# Patient Record
Sex: FEMALE | Race: WHITE | Employment: OTHER | URBAN - NONMETROPOLITAN AREA
[De-identification: names, ages, dates, MRNs, and addresses within clinical notes are randomized per-mention and may not be internally consistent; named-entity substitution may affect disease eponyms.]

---

## 2017-05-10 DIAGNOSIS — R91.1 SOLITARY LUNG NODULE: Primary | ICD-10-CM

## 2017-07-13 ENCOUNTER — OFFICE VISIT (OUTPATIENT)
Dept: AUDIOLOGY | Facility: OTHER | Age: 82
End: 2017-07-13
Attending: AUDIOLOGIST
Payer: MEDICARE

## 2017-07-13 DIAGNOSIS — H90.3 SENSORINEURAL HEARING LOSS, ASYMMETRICAL: Primary | ICD-10-CM

## 2017-07-13 PROCEDURE — 92557 COMPREHENSIVE HEARING TEST: CPT | Performed by: AUDIOLOGIST

## 2017-07-13 PROCEDURE — 92567 TYMPANOMETRY: CPT | Performed by: AUDIOLOGIST

## 2017-07-13 NOTE — MR AVS SNAPSHOT
"              After Visit Summary   7/13/2017    Marylu Schultz    MRN: [id number]           Patient Information     Date Of Birth          2/9/1933        Visit Information        Provider Department      7/13/2017 9:15 AM Lianet Basilio AuD [facility]        Today's Diagnoses     Sensorineural hearing loss, asymmetrical    -  1       Follow-ups after your visit        Your next 10 appointments already scheduled     Aug 04, 2017  9:00 AM CDT   (Arrive by 8:45 AM)   New Visit with Fidelina Avelar PA-C   [facility] [place] ([facility] [place] )    [address]  [place] [address]   [phone]            Aug 04, 2017 10:30 AM CDT   (Arrive by 10:15 AM)   Hearing Aid Initial with Cherelle Ham   [facility] [place] ([facility] [place] )    [address]  [place] [address]   [phone]              Who to contact     If you have questions or need follow up information about today's clinic visit or your schedule please contact [facility] directly at [phone].  Normal or non-critical lab and imaging results will be communicated to you by MyChart, letter or phone within 4 business days after the clinic has received the results. If you do not hear from us within 7 days, please contact the clinic through Sharely.Ushart or phone. If you have a critical or abnormal lab result, we will notify you by phone as soon as possible.  Submit refill requests through Scotty Gear or call your pharmacy and they will forward the refill request to us. Please allow 3 business days for your refill to be completed.          Additional Information About Your Visit        MyChart Information     Scotty Gear lets you send messages to your doctor, view your test results, renew your prescriptions, schedule appointments and more. To sign up, go to [email or web address] . Click on \"Log in\" on the left side of the screen, which will take you to the Welcome page. Then click on " "\"Sign up Now\" on the right side of the page.     You will be asked to enter the access code listed below, as well as some personal information. Please follow the directions to create your username and password.     Your access code is: [email or web address]  Expires: 10/11/2017  9:52 AM     Your access code will  in 90 days. If you need help or a new code, please call your [place] clinic or [phone].        Care EveryWhere ID     This is your Care EveryWhere ID. This could be used by other organizations to access your [place] medical records  [id number]         Blood Pressure from Last 3 Encounters:   No data found for BP    Weight from Last 3 Encounters:   No data found for Wt              Today, you had the following     No orders found for display       Primary Care Provider Office Phone #    Geraldine Juan [phone]       XX NO INFO FOUND XX  [facility] [address]        Equal Access to Services     [facility]: Hadii aad ku hadasho Soomaali, waaxda luqadaha, qaybta kaalmada adeegyada, waxay idiin hayaan holly elias . So [facility] [phone].    ATENCIÓN: Si habla español, tiene a raygoza disposición servicios gratuitos de asistencia lingüística. Llame al [phone].    We comply with applicable federal civil rights laws and [place] laws. We do not discriminate on the basis of race, color, national origin, age, disability sex, sexual orientation or gender identity.            Thank you!     Thank you for choosing [facility]  for your care. Our goal is always to provide you with excellent care. Hearing back from our patients is one way we can continue to improve our services. Please take a few minutes to complete the written survey that you may receive in the mail after your visit with us. Thank you!             Your Updated Medication List - Protect others around you: Learn how to safely use, store and throw away your medicines at www.disposemymeds.org.      Notice  As of 2017  " 9:52 AM    You have not been prescribed any medications.

## 2017-07-13 NOTE — PROGRESS NOTES
Audiology report faxed to primary provider Meenakshi Juan CNP. [facility] at [phone]. Telephone [phone]

## 2017-07-13 NOTE — PROGRESS NOTES
Audiology Evaluation Completed. Please refer SCANNED AUDIOGRAM and/or TYMPANOGRAM for BACKGROUND, RESULTS, RECOMMENDATIONS.    UNDER RECOMMENDATIONS ON AUDIOGRAM PATIENT REFERRED TO ENT WITH SYMPTOMS      Lianet MCCABE, [facility]-A  Audiologist #[id number]

## 2017-08-04 ENCOUNTER — OFFICE VISIT (OUTPATIENT)
Dept: OTOLARYNGOLOGY | Facility: OTHER | Age: 82
End: 2017-08-04
Attending: PHYSICIAN ASSISTANT
Payer: MEDICARE

## 2017-08-04 VITALS
SYSTOLIC BLOOD PRESSURE: 128 MMHG | TEMPERATURE: 98.5 F | DIASTOLIC BLOOD PRESSURE: 82 MMHG | HEIGHT: 62 IN | OXYGEN SATURATION: 95 % | HEART RATE: 66 BPM | BODY MASS INDEX: 22.63 KG/M2 | WEIGHT: 123 LBS

## 2017-08-04 DIAGNOSIS — H90.3 ASNHL (ASYMMETRICAL SENSORINEURAL HEARING LOSS): Primary | ICD-10-CM

## 2017-08-04 PROCEDURE — 99203 OFFICE O/P NEW LOW 30 MIN: CPT

## 2017-08-04 PROCEDURE — 99214 OFFICE O/P EST MOD 30 MIN: CPT

## 2017-08-04 PROCEDURE — 99214 OFFICE O/P EST MOD 30 MIN: CPT | Mod: 25 | Performed by: PHYSICIAN ASSISTANT

## 2017-08-04 PROCEDURE — 92504 EAR MICROSCOPY EXAMINATION: CPT | Performed by: PHYSICIAN ASSISTANT

## 2017-08-04 RX ORDER — PAROXETINE 10 MG/5ML
10 SUSPENSION ORAL EVERY MORNING
COMMUNITY

## 2017-08-04 RX ORDER — DOXEPIN HYDROCHLORIDE 10 MG/ML
10 SOLUTION ORAL AT BEDTIME
COMMUNITY

## 2017-08-04 ASSESSMENT — PAIN SCALES - GENERAL: PAINLEVEL: NO PAIN (0)

## 2017-08-04 NOTE — NURSING NOTE
"Chief Complaint   Patient presents with     Consult     ASNHL LOSS/HA Medical Clearance       Initial /82 (BP Location: Right arm, Patient Position: Chair, Cuff Size: Adult Regular)  Pulse 66  Temp 98.5  F (36.9  C) (Tympanic)  Ht 5' 2\" (1.575 m)  Wt 123 lb (55.8 kg)  SpO2 95%  BMI 22.5 kg/m2 Estimated body mass index is 22.5 kg/(m^2) as calculated from the following:    Height as of this encounter: 5' 2\" (1.575 m).    Weight as of this encounter: 123 lb (55.8 kg).  Medication Reconciliation: complete   Estela Schwartz        "

## 2017-08-04 NOTE — MR AVS SNAPSHOT
"              After Visit Summary   8/4/2017    Marylu Schultz    MRN: [id number]           Patient Information     Date Of Birth          2/9/1933        Visit Information        Provider Department      8/4/2017 9:00 AM Fidelina Avelar PA-C [facility]        Today's Diagnoses     ASNHL (asymmetrical sensorineural hearing loss)    -  1      Care Instructions    Ears were cleaned  Ears look well. There is no fluid/ infection    May proceed with hearing aid consult  Return in spring for recheck of right ear- hearing  If there are new symptoms contact nurse    If there are concerns or questions,C all [phone] and ask for nurse          Follow-ups after your visit        Your next 10 appointments already scheduled     Aug 04, 2017 10:30 AM CDT   (Arrive by 10:15 AM)   Hearing Aid Initial with Cherelle Ham   [facility] [place] ([facility] [place] )    [address]  [place] [address]   [phone]              Who to contact     If you have questions or need follow up information about today's clinic visit or your schedule please contact [facility] directly at [phone].  Normal or non-critical lab and imaging results will be communicated to you by Everyware Globalhart, letter or phone within 4 business days after the clinic has received the results. If you do not hear from us within 7 days, please contact the clinic through Everyware Globalhart or phone. If you have a critical or abnormal lab result, we will notify you by phone as soon as possible.  Submit refill requests through [employer] or call your pharmacy and they will forward the refill request to us. Please allow 3 business days for your refill to be completed.          Additional Information About Your Visit        MyChart Information     [employer] lets you send messages to your doctor, view your test results, renew your prescriptions, schedule appointments and more. To sign up, go to [email or web address] . Click on \"Log in\" " "on the left side of the screen, which will take you to the Welcome page. Then click on \"Sign up Now\" on the right side of the page.     You will be asked to enter the access code listed below, as well as some personal information. Please follow the directions to create your username and password.     Your access code is: [email or web address]  Expires: 10/11/2017  9:52 AM     Your access code will  in 90 days. If you need help or a new code, please call your [place] clinic or [phone].        Care EveryWhere ID     This is your Care EveryWhere ID. This could be used by other organizations to access your [place] medical records  [id number]        Your Vitals Were     Pulse Temperature Height Pulse Oximetry BMI (Body Mass Index)       66 98.5  F (36.9  C) (Tympanic) 5' 2\" (1.575 m) 95% 22.5 kg/m2        Blood Pressure from Last 3 Encounters:   17 128/82    Weight from Last 3 Encounters:   17 123 lb (55.8 kg)              Today, you had the following     No orders found for display       Primary Care Provider Office Phone #    Geraldine Juan [phone]       XX NO INFO FOUND XX  [facility] [address]        Equal Access to Services     [facility] : Hadii aad ku hadasho Soomaali, waaxda luqadaha, qaybta kaalmada adeegyada, waxay idiin hayyonn holly jamil laelba . So [facility] [phone].    ATENCIÓN: Si habla español, tiene a raygoza disposición servicios gratuitos de asistencia lingüística. Llame al [phone].    We comply with applicable federal civil rights laws and [place] laws. We do not discriminate on the basis of race, color, national origin, age, disability sex, sexual orientation or gender identity.            Thank you!     Thank you for choosing [facility]  for your care. Our goal is always to provide you with excellent care. Hearing back from our patients is one way we can continue to improve our services. Please take a few minutes to complete the written survey that you may " receive in the mail after your visit with us. Thank you!             Your Updated Medication List - Protect others around you: Learn how to safely use, store and throw away your medicines at www.disposemymeds.org.          This list is accurate as of: 8/4/17  9:26 AM.  Always use your most recent med list.                   Brand Name Dispense Instructions for use Diagnosis    atenolol 5 mg/mL suspension    TENORMIN     Take 25 mg by mouth daily        doxepin 10 MG/ML (HIGH CONC) solution    SINEquan     Take 10 mg by mouth At Bedtime        enalapril 1 MG/ML solution    EPANED     Take 20 mg by mouth daily        LORazepam      Inject 1 mg into the vein        melatonin 1 MG/ML Liqd liquid      Take 1 mg by mouth nightly as needed for sleep        PARoxetine 10 MG/5ML suspension    PAXIL     Take 10 mg by mouth every morning

## 2017-08-04 NOTE — PATIENT INSTRUCTIONS
Ears were cleaned  Ears look well. There is no fluid/ infection    May proceed with hearing aid consult  Return in spring for recheck of right ear- hearing  If there are new symptoms contact nurse    If there are concerns or questions,C all [phone] and ask for nurse

## 2017-08-04 NOTE — PROGRESS NOTES
"Chief Complaint   Patient presents with     Consult     ASNHL LOSS/HA Medical Clearance       Patient presents for concerns regarding hearing loss. She had audiogram completed on 7/13/17 here. There was noted ASNHL and patient presents for exam due to this findings  She does have some discomfort with loud noises   REports left ear is better hearing than right ear. She did have noted hearing loss several years ago with asymmetry,   Deboar (daughter in law). Reports difficulty hearing in conversation.   She has a hx of hearing loss for >7 years. Patient relocated from [place] and had issues with hearing loss. She reports a gradual hearing loss.   Patient did repots skin cancer with section of portion of her ear.       She has no otalgia, otorrhea  Denies vertigo  No hx of COM or otologic surgeries  No congential fm hx hearing loss.   No tinnitus.   No chronic noise exposure.     Audiogram- 7/13/17  Type A tympanograms  Thresholds are moderate to severe left and moderate-severe to severe right.   Word discrimination excellent        No past medical history on file.   No Known Allergies  Current Outpatient Prescriptions   Medication     atenolol (TENORMIN) 5 mg/mL suspension     doxepin (SINEQUAN) 10 MG/ML (HIGH CONC) solution     enalapril (EPANED) 1 MG/ML solution     LORazepam     melatonin (MELATONIN) 1 MG/ML LIQD liquid     PARoxetine (PAXIL) 10 MG/5ML suspension     No current facility-administered medications for this visit.       ROS: 10 point ROS neg other than the symptoms noted above in the HPI.    /82 (BP Location: Right arm, Patient Position: Chair, Cuff Size: Adult Regular)  Pulse 66  Temp 98.5  F (36.9  C) (Tympanic)  Ht 5' 2\" (1.575 m)  Wt 123 lb (55.8 kg)  SpO2 95%  BMI 22.5 kg/m2    General - The patient is well nourished and well developed, and appears to have good nutritional status.  Alert and oriented to person and place, answers questions and cooperates with examination appropriately.   Head " and Face - Normocephalic and atraumatic, with no gross asymmetry noted.  The facial nerve is intact, with strong symmetric movements.  Voice and Breathing - The patient was breathing comfortably without the use of accessory muscles. There was no wheezing, stridor, or stertor.  The patients voice was clear and strong, and had appropriate pitch and quality.  Ears - External ear- right with post surgical changes helical rim c/w resection.The external auditory canals are patent, the tympanic membranes are intact without effusion, retraction or mass.  Bony landmarks are intact. Scant cerumen removed. Ears examined under microscopy. TM's are without effusion.   Eyes - Extraocular movements intact, and the pupils were reactive to light.  Sclera were not icteric or injected, conjunctiva were pink and moist.  Mouth - Examination of the oral cavity showed pink, healthy oral mucosa. No lesions or ulcerations noted.  The tongue was mobile and midline, and the dentition were in good condition.    Throat - The walls of the oropharynx were smooth, pink, moist, symmetric, and had no lesions or ulcerations.  The tonsillar pillars and soft palate were symmetric.  The uvula was midline on elevation.    Neck - Normal midline excursion of the laryngotracheal complex during swallowing.  Full range of motion on passive movement.  Palpation of the occipital, submental, submandibular, internal jugular chain, and supraclavicular nodes did not demonstrate any abnormal lymph nodes or masses.  Palpation of the thyroid was soft and smooth, with no nodules or goiter appreciated.  The trachea was mobile and midline.  Nose - External contour is symmetric, no gross deflection or scars.  Nasal mucosa is pink and moist with no abnormal mucus.         ASSESSMENT:    ICD-10-CM    1. ASNHL (asymmetrical sensorineural hearing loss) H90.5 AUDIOLOGY ADULT REFERRAL          I have discussed options with the patient.  MRI of the IAC was discussed at this time  to rule out retrocochlear lesions.  We reviewed consideration, however patient is 84 and would not likely consider surgical removal. Would repeat hearing test in 6 months unless symptoms develop. She is in agreement with this plan. She has hx of R>L hearing loss for quite sometime.   The loss of hearing in the one ear leaves only one that is functioning well.  It becomes imperative that the remaining ear be protected from noise exposure and if hearing loss were to occur in the remaining ear, the patient needs to be seen in a few days so as to commence timely work-up and management.  The specifics of this were reviewed with the patient.      A hearing aide evaluation is often recommended when a patient exhibits a sensorineural hearing loss that is moderate or below a 40dB loss at 2000 Hz.       A follow-up audiogram is recommended in 6 months.  This should be sooner if a patient develops vertigo, new or asymmetric hearing loss or unresolved unilateral tinnitus.  If these symptoms were to develop, an MRI would need to be obtained.    The recommendations from the tinnitus brochure were discussed.  The most common reason for tinnitus is damage to the microscopic endings of the hearing nerve in the inner ear.  Injury to the nerve endings brings on hearing loss and often tinnitus.  Advancing age can accompany hearing loss and tinnitus.  If a person is younger, loud noise probably is the leading cause of tinnitus.  Delayed damage to hearing is often seen as well.  Ear protection from noise exposure was reviewed.  Highlights included low salt diet, control of hypertension, avoiding stimulants such as caffeine, tobacco or alcohol and proper sleep, exercise and stress management.        Fidelina Avelar PA-C  ENT  [facility] [place]  [phone]'

## 2017-08-30 ENCOUNTER — OFFICE VISIT (OUTPATIENT)
Dept: AUDIOLOGY | Facility: OTHER | Age: 82
End: 2017-08-30
Attending: AUDIOLOGIST
Payer: MEDICARE

## 2017-08-30 DIAGNOSIS — H90.3 SENSORINEURAL HEARING LOSS, BILATERAL: Primary | ICD-10-CM

## 2017-08-30 DIAGNOSIS — H90.3 ASNHL (ASYMMETRICAL SENSORINEURAL HEARING LOSS): ICD-10-CM

## 2017-08-30 PROCEDURE — 92591 ZZHC HEARING AID EXAM BINAURAL: CPT | Performed by: AUDIOLOGIST

## 2017-08-30 NOTE — PROGRESS NOTES
BACKGROUND:  Marylu was seen today for consult regarding hearing aids, accompanied by her son. The patient notes difficulty with communication in a variety of listening situations and would like to explore possible benefit obtained via use of amplification.      Patient has received medical clearance for hearing aid use from Fidelina DAS.  Marylu is a binaural hearing aid candidate and will receive a Hearing Aid Recommendation today.    RESULTS:  Brochure form [place] Department of Health titled 'Legal rights and consumer information about purchasing a hearing instrument ' verbally reviewed and given to patient. Trial Period, cancellation fee, warranties highlighted. Estimated insurance coverage for hearing aids reviewed.Patient risk factors have been provided to the patient in writing prior to the sale of the hearing aid per FDA regulation. The risk factors are also available in the User Instructional Booklet to be presented on the day of the hearing aid fitting.      ABN (Advanced Beneficiary Notice of Non-Coverage) completed and copy given to patient.    Thru use of hearing aids she would like to improve her ability to hear:    I where there are groups and noise at home with family and at prayer meetings at [facility].      Discussed hearing aid styles, technology, features, and options at length with Marylu.  Sample devices were shown. Pros/Cons of options reviewed.  Expectations for amplification discussed. .Also discussed the importance of binaural amplification for hearing speech in the presence of background noise and localizing the directions of sounds.  Wireless options were also discussed at length and sample devices were shown.     Hearing Aid Recommendations: Hearing Aid Recommendation reviewed with patient. Pt chose to proceed with order and Purchase Agreement completed. Copies provided to patient.      Hearing Aids:  Binaural Unitron Stride 700 P ITC  Color: beige  Battery Size: 312      Ear  Impression(s):  Otoscopy revealed clear ear canals bilaterally and otoblock placed appropriately.      Ear Impressions were taken and no concerns thru otoscopic visualization after removal. Tthe hearing aid(s) and earmold(s) were ordered.     RECOMMENDATIONS:  Recommended to return to clinic in 3 weeks for hearing aid fitting, programming and orientation.    Lianet Basilio M.S.,[facility]-A  Audiologist #[id number]

## 2017-08-30 NOTE — MR AVS SNAPSHOT
"              After Visit Summary   2017    Marylu Schultz    MRN: [id number]           Patient Information     Date Of Birth          1933        Visit Information        Provider Department      2017 10:00 AM Lianet Basilio AuD [facility] [place]        Today's Diagnoses     Sensorineural hearing loss, bilateral    -  1    ASNHL (asymmetrical sensorineural hearing loss)           Follow-ups after your visit        Who to contact     If you have questions or need follow up information about today's clinic visit or your schedule please contact [facility] directly at [phone].  Normal or non-critical lab and imaging results will be communicated to you by Chlorogenhart, letter or phone within 4 business days after the clinic has received the results. If you do not hear from us within 7 days, please contact the clinic through Chlorogenhart or phone. If you have a critical or abnormal lab result, we will notify you by phone as soon as possible.  Submit refill requests through [employer] or call your pharmacy and they will forward the refill request to us. Please allow 3 business days for your refill to be completed.          Additional Information About Your Visit        MyChart Information     [employer] lets you send messages to your doctor, view your test results, renew your prescriptions, schedule appointments and more. To sign up, go to [email or web address] . Click on \"Log in\" on the left side of the screen, which will take you to the Welcome page. Then click on \"Sign up Now\" on the right side of the page.     You will be asked to enter the access code listed below, as well as some personal information. Please follow the directions to create your username and password.     Your access code is: [email or web address]  Expires: 10/11/2017  9:52 AM     Your access code will  in 90 days. If you need help or a new code, please call your [place] clinic or [phone].        Care EveryWhere ID  "    This is your Care EveryWhere ID. This could be used by other organizations to access your [place] medical records  [id number]         Blood Pressure from Last 3 Encounters:   08/04/17 128/82    Weight from Last 3 Encounters:   08/04/17 123 lb (55.8 kg)              We Performed the Following     AUDIOLOGY ADULT REFERRAL        Primary Care Provider Office Phone #    Geraldine Juan [phone]       XX NO INFO FOUND XX  [place] [address]        Equal Access to Services     [facility]: Hadii aad ku hadasho Soomaali, waaxda luqadaha, qaybta kaalmada adeegyada, waxay idiin hayaan adeeg kharash la'aan . So [facility] [phone].    ATENCIÓN: Si habla español, tiene a raygoza disposición servicios gratuitos de asistencia lingüística. Ll[facility] [phone].    We comply with applicable federal civil rights laws and [place] laws. We do not discriminate on the basis of race, color, national origin, age, disability sex, sexual orientation or gender identity.            Thank you!     Thank you for choosing [facility] [place]  for your care. Our goal is always to provide you with excellent care. Hearing back from our patients is one way we can continue to improve our services. Please take a few minutes to complete the written survey that you may receive in the mail after your visit with us. Thank you!             Your Updated Medication List - Protect others around you: Learn how to safely use, store and throw away your medicines at www.disposemymeds.org.          This list is accurate as of: 8/30/17 10:17 AM.  Always use your most recent med list.                   Brand Name Dispense Instructions for use Diagnosis    atenolol 5 mg/mL suspension    TENORMIN     Take 25 mg by mouth daily        doxepin 10 MG/ML (HIGH CONC) solution    SINEquan     Take 10 mg by mouth At Bedtime        enalapril 1 MG/ML solution    EPANED     Take 20 mg by mouth daily        LORazepam      Inject 1 mg into the vein         melatonin 1 MG/ML Liqd liquid      Take 1 mg by mouth nightly as needed for sleep        PARoxetine 10 MG/5ML suspension    PAXIL     Take 10 mg by mouth every morning

## 2017-09-20 ENCOUNTER — OFFICE VISIT (OUTPATIENT)
Dept: AUDIOLOGY | Facility: OTHER | Age: 82
End: 2017-09-20
Attending: AUDIOLOGIST
Payer: MEDICARE

## 2017-09-20 DIAGNOSIS — H90.3 SENSORINEURAL HEARING LOSS, BILATERAL: Primary | ICD-10-CM

## 2017-09-20 PROCEDURE — V5259 HEARING AID, DIGIT, BIN, ITC: HCPCS | Performed by: AUDIOLOGIST

## 2017-09-20 NOTE — PROGRESS NOTES
AUDIOLOGY REPORT    BACKGROUND INFORMATION: Marylu Schultz was seen in the Audiology Clinic  on 9/20/2017 for fitting of Unitron Stride 600 IN-THE-CANAL Hearing aids.      Serial Number Right: #[id number]  Serial Number Left: #[id number]  Battery Size: #312  Repair and loss and damage warranty expiration:10/4/2020      PROCEDURES: Hearing aids were placed and they provided a good fit,.   After this fitting an orientation was completed giving her information on the use of the instruments. .  Patient was counseled on the occlusion effect, acclimating to hearing aids and adaptation time. Use and care of the instruments was reviewed.  Hearing aid conformity evaluation was performed with good results. Patient was provided wire loop and brush cleaning tools which was demonstrated and patient showed ability to use. The patient demonstrated good ability to insert and remove the earmolds and batteries. Patient was given all supplies 16 cells,  and a Manual for the hearing aids.  How to manually use program toggle for the volume was discussed at length.     SUMMARY AND RECOMMENDATIONS: Hearing aids  were fit today.   The patient will return for follow-up.  Call this clinic with questions regarding today s appointment.        Lianet Basilio M.S., [facility]  Audiologist #[id number]

## 2017-09-20 NOTE — MR AVS SNAPSHOT
"              After Visit Summary   2017    Marylu Schultz    MRN: [id number]           Patient Information     Date Of Birth          1933        Visit Information        Provider Department      2017 9:30 AM Lianet Basilio AuD [facility]        Today's Diagnoses     Sensorineural hearing loss, bilateral    -  1       Follow-ups after your visit        Who to contact     If you have questions or need follow up information about today's clinic visit or your schedule please contact [facility] directly at [phone].  Normal or non-critical lab and imaging results will be communicated to you by Arnicahart, letter or phone within 4 business days after the clinic has received the results. If you do not hear from us within 7 days, please contact the clinic through Arnicahart or phone. If you have a critical or abnormal lab result, we will notify you by phone as soon as possible.  Submit refill requests through [employer] or call your pharmacy and they will forward the refill request to us. Please allow 3 business days for your refill to be completed.          Additional Information About Your Visit        MyChart Information     [employer] lets you send messages to your doctor, view your test results, renew your prescriptions, schedule appointments and more. To sign up, go to [email or web address] . Click on \"Log in\" on the left side of the screen, which will take you to the Welcome page. Then click on \"Sign up Now\" on the right side of the page.     You will be asked to enter the access code listed below, as well as some personal information. Please follow the directions to create your username and password.     Your access code is: [email or web address]  Expires: 10/11/2017  9:52 AM     Your access code will  in 90 days. If you need help or a new code, please call your [facility] or [phone].        Care EveryWhere ID     This is your Care EveryWhere ID. This could be used " by other organizations to access your [facility] medical records  [id number]         Blood Pressure from Last 3 Encounters:   08/04/17 128/82    Weight from Last 3 Encounters:   08/04/17 123 lb (55.8 kg)              Today, you had the following     No orders found for display       Primary Care Provider Office Phone #    Geraldine Juan [phone]       XX NO INFO FOUND XX  [facility] [address]        Equal Access to Services     [facility]: Hadii aad ku hadasho Soomaali, waaxda luqadaha, qaybta kaalmada adeegyada, waxay idiin hayaan adeeg kharash la'aan ah. So [facility] [phone].    ATENCIÓN: Si habla español, tiene a raygoza disposición servicios gratuitos de asistencia lingüística. Llame al [phone].    We comply with applicable federal civil rights laws and [place] laws. We do not discriminate on the basis of race, color, national origin, age, disability sex, sexual orientation or gender identity.            Thank you!     Thank you for choosing [facility]  for your care. Our goal is always to provide you with excellent care. Hearing back from our patients is one way we can continue to improve our services. Please take a few minutes to complete the written survey that you may receive in the mail after your visit with us. Thank you!             Your Updated Medication List - Protect others around you: Learn how to safely use, store and throw away your medicines at www.disposemymeds.org.          This list is accurate as of: 9/20/17 10:11 AM.  Always use your most recent med list.                   Brand Name Dispense Instructions for use Diagnosis    atenolol 5 mg/mL suspension    TENORMIN     Take 25 mg by mouth daily        doxepin 10 MG/ML (HIGH CONC) solution    SINEquan     Take 10 mg by mouth At Bedtime        enalapril 1 MG/ML solution    EPANED     Take 20 mg by mouth daily        LORazepam      Inject 1 mg into the vein        melatonin 1 MG/ML Liqd liquid      Take 1 mg by mouth  nightly as needed for sleep        PARoxetine 10 MG/5ML suspension    PAXIL     Take 10 mg by mouth every morning

## 2017-10-09 ENCOUNTER — OFFICE VISIT (OUTPATIENT)
Dept: AUDIOLOGY | Facility: OTHER | Age: 82
End: 2017-10-09
Attending: AUDIOLOGIST
Payer: MEDICARE

## 2017-10-09 DIAGNOSIS — H90.3 SENSORINEURAL HEARING LOSS, BILATERAL: Primary | ICD-10-CM

## 2017-10-09 PROCEDURE — V5299 HEARING SERVICE: HCPCS | Performed by: AUDIOLOGIST

## 2017-10-09 NOTE — PROGRESS NOTES
AUDIOLOGY REPORT    BACKGROUND INFORMATION: Marylu Schultz was seen in the Audiology Clinic at [facility]  on 10/9/2017 for follow-up.   The patient was fit with hearing aids. The patient reports good sound quality with the hearing aid(s).        RESULTS: A first follow-up was performed.  Her family assists with hearing aid care. Reviewed insertion at length and practiced  right ear as most difficult. Reviewed goals and reported to be met and patient and family reports improved hearing and benefit. She wants to keep the aids. Battery supply provided.  She uses volume appropriately.    SUMMARY AND RECOMMENDATIONS: A hearing aid check was performed today and the patient reports satisfaction. Follow up as needed or in 12 months. Call this clinic with questions regarding today s visit.    Lianet Basilio M.S., [facility]  Audiologist #[id number]

## 2017-10-09 NOTE — MR AVS SNAPSHOT
"              After Visit Summary   10/9/2017    Marylu Schultz    MRN: [id number]           Patient Information     Date Of Birth          2/9/1933        Visit Information        Provider Department      10/9/2017 9:15 AM Lianet Basilio AuD [facility]        Today's Diagnoses     Sensorineural hearing loss, bilateral    -  1       Follow-ups after your visit        Your next 10 appointments already scheduled     Sep 04, 2018 10:30 AM CDT   (Arrive by 10:15 AM)   Hearing Eval with Cherelle Ham   [facility] [place] ([facility] [place] )    [address]  [place] [address]   [phone]              Who to contact     If you have questions or need follow up information about today's clinic visit or your schedule please contact [facility] directly at [phone].  Normal or non-critical lab and imaging results will be communicated to you by MyChart, letter or phone within 4 business days after the clinic has received the results. If you do not hear from us within 7 days, please contact the clinic through MyChart or phone. If you have a critical or abnormal lab result, we will notify you by phone as soon as possible.  Submit refill requests through [employer] or call your pharmacy and they will forward the refill request to us. Please allow 3 business days for your refill to be completed.          Additional Information About Your Visit        MyChart Information     [employer] lets you send messages to your doctor, view your test results, renew your prescriptions, schedule appointments and more. To sign up, go to [email or web address] . Click on \"Log in\" on the left side of the screen, which will take you to the Welcome page. Then click on \"Sign up Now\" on the right side of the page.     You will be asked to enter the access code listed below, as well as some personal information. Please follow the directions to create your username and password.     Your " access code is: [email or web address]  Expires: 10/11/2017  9:52 AM     Your access code will  in 90 days. If you need help or a new code, please call your [place] clinic or [phone].        Care EveryWhere ID     This is your Care EveryWhere ID. This could be used by other organizations to access your [place] medical records  [id number]         Blood Pressure from Last 3 Encounters:   17 128/82    Weight from Last 3 Encounters:   17 123 lb (55.8 kg)              Today, you had the following     No orders found for display       Primary Care Provider Office Phone # Fax #    Geraldine Juan [phone]       [facility] [address] [facility] [address]        Equal Access to Services     [facility] : Hadii kirstin ramírez hadasho Soomaali, waaxda luqadaha, qaybta kaalmada adeegyada, tk laura hayjennifer elias . So [facility] [phone].    ATENCIÓN: Si habla español, tiene a raygoza disposición servicios gratuitos de asistencia lingüística. Llame al [phone].    We comply with applicable federal civil rights laws and [place] laws. We do not discriminate on the basis of race, color, national origin, age, disability, sex, sexual orientation, or gender identity.            Thank you!     Thank you for choosing [facility]  for your care. Our goal is always to provide you with excellent care. Hearing back from our patients is one way we can continue to improve our services. Please take a few minutes to complete the written survey that you may receive in the mail after your visit with us. Thank you!             Your Updated Medication List - Protect others around you: Learn how to safely use, store and throw away your medicines at www.disposemymeds.org.          This list is accurate as of: 10/9/17 10:34 AM.  Always use your most recent med list.                   Brand Name Dispense Instructions for use Diagnosis    atenolol 5 mg/mL suspension    TENORMIN      Take 25 mg by mouth daily        doxepin 10 MG/ML (HIGH CONC) solution    SINEquan     Take 10 mg by mouth At Bedtime        enalapril 1 MG/ML solution    EPANED     Take 20 mg by mouth daily        LORazepam      Inject 1 mg into the vein        melatonin 1 MG/ML Liqd liquid      Take 1 mg by mouth nightly as needed for sleep        PARoxetine 10 MG/5ML suspension    PAXIL     Take 10 mg by mouth every morning

## 2018-08-09 DIAGNOSIS — H91.93 DECREASED HEARING OF BOTH EARS: Primary | ICD-10-CM

## 2019-09-08 ENCOUNTER — HOSPITAL ENCOUNTER (EMERGENCY)
Facility: HOSPITAL | Age: 84
Discharge: HOME | End: 2019-09-08
Attending: NURSE PRACTITIONER | Admitting: NURSE PRACTITIONER
Payer: MEDICARE

## 2019-09-08 VITALS
DIASTOLIC BLOOD PRESSURE: 77 MMHG | RESPIRATION RATE: 22 BRPM | TEMPERATURE: 98.3 F | OXYGEN SATURATION: 98 % | SYSTOLIC BLOOD PRESSURE: 180 MMHG

## 2019-09-08 DIAGNOSIS — N30.00 ACUTE CYSTITIS WITHOUT HEMATURIA: ICD-10-CM

## 2019-09-08 LAB
ALBUMIN UR-MCNC: 10 MG/DL
AMORPH CRY #/AREA URNS HPF: ABNORMAL /HPF
APPEARANCE UR: ABNORMAL
BACTERIA #/AREA URNS HPF: ABNORMAL /HPF
BILIRUB UR QL STRIP: NEGATIVE
COLOR UR AUTO: YELLOW
GLUCOSE UR STRIP-MCNC: NEGATIVE MG/DL
HGB UR QL STRIP: ABNORMAL
KETONES UR STRIP-MCNC: 5 MG/DL
LEUKOCYTE ESTERASE UR QL STRIP: ABNORMAL
MUCOUS THREADS #/AREA URNS LPF: PRESENT /LPF
NITRATE UR QL: NEGATIVE
PH UR STRIP: 8.5 PH (ref 4.7–8)
RBC #/AREA URNS AUTO: 24 /HPF (ref 0–2)
SOURCE: ABNORMAL
SP GR UR STRIP: 1.02 (ref 1–1.03)
UROBILINOGEN UR STRIP-MCNC: NORMAL MG/DL (ref 0–2)
WBC #/AREA URNS AUTO: >182 /HPF (ref 0–5)
WBC CLUMPS #/AREA URNS HPF: PRESENT /HPF

## 2019-09-08 PROCEDURE — 99203 OFFICE O/P NEW LOW 30 MIN: CPT | Mod: Z6 | Performed by: NURSE PRACTITIONER

## 2019-09-08 PROCEDURE — G0463 HOSPITAL OUTPT CLINIC VISIT: HCPCS

## 2019-09-08 PROCEDURE — 81001 URINALYSIS AUTO W/SCOPE: CPT | Performed by: FAMILY MEDICINE

## 2019-09-08 RX ORDER — CIPROFLOXACIN 500 MG/1
500 TABLET, FILM COATED ORAL 2 TIMES DAILY
Qty: 10 TABLET | Refills: 0 | Status: SHIPPED | OUTPATIENT
Start: 2019-09-08 | End: 2019-09-13

## 2019-09-08 ASSESSMENT — ENCOUNTER SYMPTOMS
CHILLS: 0
GASTROINTESTINAL NEGATIVE: 1
PSYCHIATRIC NEGATIVE: 1
MUSCULOSKELETAL NEGATIVE: 1
ENDOCRINE NEGATIVE: 1
FREQUENCY: 1
RESPIRATORY NEGATIVE: 1
ALLERGIC/IMMUNOLOGIC NEGATIVE: 1
DYSURIA: 1
NEUROLOGICAL NEGATIVE: 1
HEMATOLOGIC/LYMPHATIC NEGATIVE: 1
FEVER: 0
CARDIOVASCULAR NEGATIVE: 1

## 2019-09-08 NOTE — ED AVS SNAPSHOT
[facility]  [address] [address]  Phone:  [phone]                                    Marylu Schultz   MRN: [id number]    Department:  [facility]   Date of Visit:  9/8/2019           After Visit Summary Signature Page    I have received my discharge instructions, and my questions have been answered. I have discussed any challenges I see with this plan with the nurse or doctor.    ..........................................................................................................................................  Patient/Patient Representative Signature      ..........................................................................................................................................  Patient Representative Print Name and Relationship to Patient    ..................................................               ................................................  Date                                   Time    ..........................................................................................................................................  Reviewed by Signature/Title    ...................................................              ..............................................  Date                                               Time          22EPIC Rev 08/18

## 2019-09-08 NOTE — DISCHARGE INSTRUCTIONS
Stay hydrated  Continue with cranberry tablets  Follow up with your doctor if you are not feeling better in 3 days  Return here if symptoms worsen, such as fevers, chills, increased pain or any concerns

## 2019-09-08 NOTE — ED NOTES
Pt unable to void. Gave pt given a glass of water and will try again when she feels like she can go.

## 2019-09-08 NOTE — ED PROVIDER NOTES
History     Chief Complaint   Patient presents with     Rule out Urinary Tract Infection     pain with urination     The history is provided by the patient.     Marylu Schultz is a 86 year old female who presents to the  for 2 days of UTI symptoms. Pain with urination and sometimes right before urination also some urgency and frequency with urination.  She states she does not feel like she is drinking enough fluids. She states she tries to sip all day on coffee and water. She denies any treatment at home.     Allergies:  No Known Allergies    Problem List:    There are no active problems to display for this patient.       Past Medical History:    History reviewed. No pertinent past medical history.    Past Surgical History:    History reviewed. No pertinent surgical history.    Family History:    No family history on file.    Social History:  Marital Status:   [5]  Social History     Tobacco Use     Smoking status: Never Smoker     Smokeless tobacco: Never Used   Substance Use Topics     Alcohol use: None     Drug use: None        Medications:      atenolol (TENORMIN) 5 mg/mL suspension   ciprofloxacin (CIPRO) 500 MG tablet   doxepin (SINEQUAN) 10 MG/ML (HIGH CONC) solution   enalapril (EPANED) 1 MG/ML solution   melatonin (MELATONIN) 1 MG/ML LIQD liquid   PARoxetine (PAXIL) 10 MG/5ML suspension   LORazepam         Review of Systems   Constitutional: Negative for chills and fever.   HENT: Negative.    Respiratory: Negative.    Cardiovascular: Negative.    Gastrointestinal: Negative.    Endocrine: Negative.    Genitourinary: Positive for dysuria, frequency and urgency.   Musculoskeletal: Negative.    Skin: Negative.    Allergic/Immunologic: Negative.    Neurological: Negative.    Hematological: Negative.    Psychiatric/Behavioral: Negative.        Physical Exam   BP: 180/77  Heart Rate: 52  Temp: 98.3  F (36.8  C)  Resp: 22  SpO2: 98 %      Physical Exam   Constitutional: She is oriented to person,  place, and time.   Cardiovascular: Normal heart sounds.   Pulmonary/Chest: Effort normal. No respiratory distress.   Abdominal: Soft. Bowel sounds are normal. There is no tenderness.   Neurological: She is alert and oriented to person, place, and time.   Skin: Skin is warm and dry.   Psychiatric: She has a normal mood and affect.   Nursing note and vitals reviewed.      ED Course        Procedures               Critical Care time:  none               Results for orders placed or performed during the hospital encounter of 09/08/19 (from the past 24 hour(s))   Routine UA with microscopic   Result Value Ref Range    Color Urine Yellow     Appearance Urine Slightly Cloudy     Glucose Urine Negative NEG^Negative mg/dL    Bilirubin Urine Negative NEG^Negative    Ketones Urine 5 (A) NEG^Negative mg/dL    Specific Gravity Urine 1.017 1.003 - 1.035    Blood Urine Small (A) NEG^Negative    pH Urine 8.5 (H) 4.7 - 8.0 pH    Protein Albumin Urine 10 (A) NEG^Negative mg/dL    Urobilinogen mg/dL Normal 0.0 - 2.0 mg/dL    Nitrite Urine Negative NEG^Negative    Leukocyte Esterase Urine Large (A) NEG^Negative    Source Midstream Urine     WBC Urine >182 (H) 0 - 5 /HPF    RBC Urine 24 (H) 0 - 2 /HPF    WBC Clumps Present (A) NEG^Negative /HPF    Bacteria Urine None (A) NEG^Negative /HPF    Mucous Urine Present (A) NEG^Negative /LPF    Amorphous Crystals Many (A) NEG^Negative /HPF       Medications - No data to display    Assessments & Plan (with Medical Decision Making)     I have reviewed the nursing notes.    I have reviewed the findings, diagnosis, plan and need for follow up with the patient.      Patient educated and has no questions.  Encouraged hydration  Can try OTC azo for symptom management  Take medication as directed  Patient given further education sheets for each of the diagnoses.  Follow up with your PCP in 3 days if your symptoms have not resolved.  Follow up with UC/ED if symptoms increase or if fever/further concerns  develop.    Stay hydrated  Continue with cranberry tablets  Follow up with your doctor if you are not feeling better in 3 days  Return here if symptoms worsen, such as fevers, chills, increased pain or any concerns       Discharge Medication List as of 9/8/2019 10:39 AM      START taking these medications    Details   ciprofloxacin (CIPRO) 500 MG tablet Take 1 tablet (500 mg) by mouth 2 times daily for 5 days, Disp-10 tablet, R-0, E-Prescribe             Final diagnoses:   Acute cystitis without hematuria       9/8/2019   [facility]     [facility] APR[facility] CNP  09/08/19 1043